# Patient Record
Sex: FEMALE | Race: BLACK OR AFRICAN AMERICAN | Employment: UNEMPLOYED | URBAN - METROPOLITAN AREA
[De-identification: names, ages, dates, MRNs, and addresses within clinical notes are randomized per-mention and may not be internally consistent; named-entity substitution may affect disease eponyms.]

---
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
## 2021-08-14 ENCOUNTER — HOSPITAL ENCOUNTER (EMERGENCY)
Age: 21
Discharge: HOME | End: 2021-08-14
Attending: EMERGENCY MEDICINE
Payer: COMMERCIAL

## 2021-08-14 VITALS
OXYGEN SATURATION: 100 % | DIASTOLIC BLOOD PRESSURE: 68 MMHG | TEMPERATURE: 97.4 F | HEART RATE: 88 BPM | HEIGHT: 64 IN | RESPIRATION RATE: 18 BRPM | WEIGHT: 293 LBS | BODY MASS INDEX: 50.02 KG/M2 | SYSTOLIC BLOOD PRESSURE: 139 MMHG

## 2021-08-14 DIAGNOSIS — B37.9 YEAST INFECTION: Primary | ICD-10-CM

## 2021-08-14 DIAGNOSIS — N76.0 VAGINITIS AND VULVOVAGINITIS: ICD-10-CM

## 2021-08-14 DIAGNOSIS — O16.3 ELEVATED BLOOD PRESSURE AFFECTING PREGNANCY IN THIRD TRIMESTER, ANTEPARTUM: ICD-10-CM

## 2021-08-14 LAB
BACTERIA WET PREP: ABNORMAL
BACTERIA: ABNORMAL /HPF
BILIRUBIN URINE: NEGATIVE
BLOOD, URINE: NEGATIVE
CLARITY: ABNORMAL
CLUE CELLS: ABNORMAL
COLOR: YELLOW
EPITHELIAL CELLS WET PREP: ABNORMAL
EPITHELIAL CELLS, UA: ABNORMAL /HPF (ref 0–5)
GLUCOSE URINE: NEGATIVE MG/DL
HCG(URINE) PREGNANCY TEST: POSITIVE
KETONES, URINE: NEGATIVE MG/DL
LEUKOCYTE ESTERASE, URINE: ABNORMAL
MICROSCOPIC EXAMINATION: YES
MUCUS: ABNORMAL /LPF
NITRITE, URINE: NEGATIVE
PH UA: 7 (ref 5–8)
PROTEIN UA: NEGATIVE MG/DL
RBC UA: ABNORMAL /HPF (ref 0–4)
RBC WET PREP: ABNORMAL
SOURCE WET PREP: ABNORMAL
SPECIFIC GRAVITY UA: 1.02 (ref 1–1.03)
TRICHOMONAS PREP: ABNORMAL
URINE TYPE: ABNORMAL
UROBILINOGEN, URINE: 0.2 E.U./DL
WBC UA: ABNORMAL /HPF (ref 0–5)
WBC WET PREP: ABNORMAL
YEAST WET PREP: ABNORMAL
YEAST: PRESENT /HPF

## 2021-08-14 PROCEDURE — 6370000000 HC RX 637 (ALT 250 FOR IP): Performed by: EMERGENCY MEDICINE

## 2021-08-14 PROCEDURE — 87210 SMEAR WET MOUNT SALINE/INK: CPT

## 2021-08-14 PROCEDURE — 87491 CHLMYD TRACH DNA AMP PROBE: CPT

## 2021-08-14 PROCEDURE — 99284 EMERGENCY DEPT VISIT MOD MDM: CPT

## 2021-08-14 PROCEDURE — 81001 URINALYSIS AUTO W/SCOPE: CPT

## 2021-08-14 PROCEDURE — 84703 CHORIONIC GONADOTROPIN ASSAY: CPT

## 2021-08-14 PROCEDURE — 87591 N.GONORRHOEAE DNA AMP PROB: CPT

## 2021-08-14 RX ORDER — CLOTRIMAZOLE 1 %
100 CREAM WITH APPLICATOR VAGINAL NIGHTLY
Qty: 7 SUPPOSITORY | Refills: 0 | Status: SHIPPED | OUTPATIENT
Start: 2021-08-14 | End: 2021-08-21

## 2021-08-14 RX ORDER — FLUCONAZOLE 150 MG/1
150 TABLET ORAL ONCE
Status: COMPLETED | OUTPATIENT
Start: 2021-08-14 | End: 2021-08-14

## 2021-08-14 RX ORDER — ASPIRIN 81 MG/1
81 TABLET ORAL DAILY
COMMUNITY

## 2021-08-14 RX ADMIN — FLUCONAZOLE 150 MG: 150 TABLET ORAL at 13:56

## 2021-08-14 ASSESSMENT — ENCOUNTER SYMPTOMS
SHORTNESS OF BREATH: 0
COLOR CHANGE: 0
VOMITING: 0
EYE PAIN: 0
NAUSEA: 0
BACK PAIN: 0
EYE REDNESS: 0
ABDOMINAL PAIN: 0
PHOTOPHOBIA: 0
CHEST TIGHTNESS: 0

## 2021-08-14 ASSESSMENT — PAIN DESCRIPTION - PAIN TYPE: TYPE: ACUTE PAIN

## 2021-08-14 ASSESSMENT — PAIN DESCRIPTION - LOCATION: LOCATION: VAGINA

## 2021-08-14 ASSESSMENT — PAIN SCALES - GENERAL: PAINLEVEL_OUTOF10: 4

## 2021-08-14 ASSESSMENT — PAIN DESCRIPTION - DESCRIPTORS: DESCRIPTORS: BURNING;ITCHING

## 2021-08-14 NOTE — ED NOTES
Transfer center on  The line with Dr. Regan Rodarte to speak with Dr. Kenrick Vasquez.      Chinedu Davila, [facility]  08/14/21 7184

## 2021-08-14 NOTE — ED PROVIDER NOTES
[address]        Pt Name: Melita Mast  MRN: [id number]  [place] 2000  Date of evaluation: 8/14/2021  Provider: Chrystie Boeck, MD  PCP: No primary care provider on file. CHIEF COMPLAINT       Chief Complaint   Patient presents with    Vaginal Itching    Dysuria       HISTORY OFPRESENT ILLNESS   (Location/Symptom, Timing/Onset, Context/Setting, Quality, Duration, Modifying Factors,Severity)  Note limiting factors. Melita Mast is a 22 y.o. female presenting today due to concern for having some increased vaginal discharge with some reported white clumps in her urine over the last few days. She states that last night she was having some burning and itching with urination along with some lower abdominal discomfort that caused her concern. Currently she denies any abdominal or pelvic pain. No chest pain or shortness of breath. No abnormal leg swelling. She states that she is seeing [facility] with obstetrics and is supposed to go this upcoming Wednesday for a appointment. She is 29 weeks pregnant. She denies any vaginal bleeding. She states baby was moving normally this morning. She denies any headache or vision changes. No fever. No nausea or vomiting. No concern with STDs. She is on aspirin for her blood pressure but denies any other medications other than what sounds like cetirizine for \"hayfever\" (starts with a C per patient and \"that sounds right\" when I said cetrizine). She denies feeling any contractions in regards to her pregnancy. REVIEW OF SYSTEMS    (2-9 systems for level 4, 10 or more for level 5)     Review of Systems   Constitutional: Negative for chills, diaphoresis, fatigue and fever. HENT: Negative for congestion. Eyes: Negative for photophobia, pain, redness and visual disturbance. Respiratory: Negative for chest tightness and shortness of breath.     Cardiovascular: Negative for chest pain and leg swelling. Gastrointestinal: Negative for abdominal pain, nausea and vomiting. Genitourinary: Positive for dysuria and vaginal discharge. Negative for difficulty urinating, flank pain, pelvic pain (none currently), vaginal bleeding and vaginal pain. Musculoskeletal: Negative for back pain (chronic intermittently but denying any currently and no changes recently with her chronic intermittent pain) and neck pain. Skin: Negative for color change and wound. Neurological: Negative for weakness, numbness and headaches. Psychiatric/Behavioral: Negative for confusion. The patient is not nervous/anxious. Positives and Pertinent negatives as per HPI. PASTMEDICAL HISTORY   History reviewed. No pertinent past medical history. SURGICAL HISTORY     History reviewed. No pertinent surgical history. CURRENT MEDICATIONS       Previous Medications    ASPIRIN 81 MG EC TABLET    Take 81 mg by mouth daily    MEDROXYPROGESTERONE ACETATE (DEPO-PROVERA IM)    Inject into the muscle    NAPROXEN (NAPROSYN) 500 MG TABLET    Take 1 tablet by mouth 2 times daily as needed for Pain (with food)    ONDANSETRON (ZOFRAN) 4 MG TABLET    Take 1 tablet by mouth every 8 hours as needed for Nausea       ALLERGIES     Food    FAMILY HISTORY     History reviewed. No pertinent family history.        SOCIAL HISTORY       Social History     Socioeconomic History    Marital status: Single     Spouse name: None    Number of children: None    Years of education: None    Highest education level: None   Occupational History    None   Tobacco Use    Smoking status: Passive Smoke Exposure - Never Smoker    Smokeless tobacco: Never Used   Substance and Sexual Activity    Alcohol use: No    Drug use: No    Sexual activity: Yes     Partners: Male   Other Topics Concern    None   Social History Narrative    None     Social Determinants of Health     Financial Resource Strain:     Difficulty of Paying Living Expenses: Food Insecurity:     Worried About Running Out of Food in the Last Year:     [address] in the Last Year:    Transportation Needs:     Lack of Transportation (Medical):  Lack of Transportation (Non-Medical):    Physical Activity:     Days of Exercise per Week:     Minutes of Exercise per Session:    Stress:     Feeling of Stress :    Social Connections:     Frequency of Communication with Friends and Family:     Frequency of Social Gatherings with Friends and Family:     Attends Sikh Services:     Active Member of Clubs or Organizations:     Attends Club or Organization Meetings:     Marital Status:    Intimate Partner Violence:     Fear of Current or Ex-Partner:     Emotionally Abused:     Physically Abused:     Sexually Abused:        SCREENINGS    Martin Coma Scale  Eye Opening: Spontaneous  Best Verbal Response: Oriented  Best Motor Response: Obeys commands  Martin Coma Scale Score: 15           PHYSICAL EXAM    (up to 7 for level 4, 8 or more for level 5)     ED Triage Vitals [08/14/21 1218]   BP Temp Temp Source Pulse Resp SpO2 Height Weight   (!) 154/96 97.4 °F (36.3 °C) Oral 110 18 100 % 5' 4\" (1.626 m) 296 lb 8 oz (134.5 kg)       Physical Exam  Vitals and nursing note reviewed. Constitutional:       General: She is awake. She is not in acute distress. Appearance: Normal appearance. She is well-developed and well-groomed. She is morbidly obese. She is not ill-appearing, toxic-appearing or diaphoretic. Interventions: She is not intubated. HENT:      Head: Normocephalic and atraumatic. Right Ear: External ear normal.      Left Ear: External ear normal.      Mouth/Throat:      Mouth: Mucous membranes are moist.   Eyes:      General:         Right eye: No discharge. Left eye: No discharge. Cardiovascular:      Rate and Rhythm: Normal rate and regular rhythm. Pulses: Normal pulses.    Pulmonary:      Effort: Pulmonary effort is normal. No tachypnea, bradypnea, accessory muscle usage, prolonged expiration, respiratory distress or retractions. She is not intubated. Breath sounds: Normal breath sounds and air entry. No stridor, decreased air movement or transmitted upper airway sounds. No decreased breath sounds, wheezing, rhonchi or rales. Abdominal:      General: Abdomen is flat. Bowel sounds are normal.      Palpations: Abdomen is soft. Tenderness: There is no abdominal tenderness. There is no right CVA tenderness, left CVA tenderness, guarding or rebound. Negative signs include Morton's sign and McBurney's sign. Comments: Gravid uterus    Genitourinary:     Comments: Patient declined pelvic exam   Musculoskeletal:         General: No swelling, tenderness, deformity or signs of injury. Normal range of motion. Cervical back: Normal range of motion and neck supple. No rigidity. Right lower leg: No edema. Left lower leg: No edema. Skin:     General: Skin is warm and dry. Neurological:      General: No focal deficit present. Mental Status: She is alert and oriented to person, place, and time. Mental status is at baseline. GCS: GCS eye subscore is 4. GCS verbal subscore is 5. GCS motor subscore is 6. Cranial Nerves: No dysarthria. Motor: Motor function is intact. No weakness, tremor, atrophy, abnormal muscle tone or seizure activity. Psychiatric:         Attention and Perception: Attention normal.         Mood and Affect: Mood and affect normal.         Speech: Speech normal. Speech is not slurred. Behavior: Behavior normal. Behavior is cooperative. Fetal heart tones equal 128.       DIAGNOSTIC RESULTS   :    Labs Reviewed   WET PREP, GENITAL - Abnormal; Notable for the following components:       Result Value    Yeast, Wet Prep 3+ (*)     All other components within normal limits    Narrative:     Performed at:  [facility]  [address] [place] [address]   Phone [phone] - Abnormal; Notable for the following components:    Clarity, UA SL CLOUDY (*)     Leukocyte Esterase, Urine MODERATE (*)     All other components within normal limits    Narrative:     Performed at:  [facility]  [facility] [address]  [facility] [place]   Phone [phone]   MICROSCOPIC URINALYSIS - Abnormal; Notable for the following components:    Mucus, UA Rare (*)     Epithelial Cells, UA 6-10 (*)     Bacteria, UA Rare (*)     Yeast, UA Present (*)     All other components within normal limits    Narrative:     Performed at:  [facility]  [facility] [place],  [place] [address]   Phone [phone]   C. TRACHOMATIS N.GONORRHOEAE DNA   PREGNANCY, URINE    Narrative:     Performed at:  [facility]  [facility] [place],  [place] [address]   Phone [phone]       All other labs were within normal range or not returned asof this dictation. EKG:  All EKG's are interpreted by the Emergency Department Physician who either signs or Co-signs this chart in the absence of a cardiologist.        RADIOLOGY:   Non-plain film images such as CT, Ultrasound and MRI are read by the radiologist. Anola Rodolfo images are visualized and preliminarily interpreted by the  ED Provider with the belowfindings:        Interpretation per the Radiologist below, if available at the time of this note:    No orders to display         PROCEDURES   Unless otherwise noted below, none     Procedures    CRITICAL CARE TIME   N/A    CONSULTS: Spoke with Dr. Marla Montgomery at [phone] and she recommended 1 dose of oral fluconazole at 150 mg but otherwise miconazole suppositories over the next week for her vaginitis due to yeast.  I did discuss with her that on Uptodate it recommend vaginal over oral medication with the azole drug class but she states since she is third trimester, this is safe to give during pregnancy and recommended a one-time dose. I did tell the patient about the obstetrics recommendation as well. IP CONSULT TO OB GYN    EMERGENCY DEPARTMENT COURSE and DIFFERENTIAL DIAGNOSIS/MDM:   Vitals:    Vitals:    08/14/21 1218 08/14/21 1325   BP: (!) 154/96 139/68   Pulse: 110 88   Resp: 18    Temp: 97.4 °F (36.3 °C)    TempSrc: Oral    SpO2: 100%    Weight: 296 lb 8 oz (134.5 kg)    Height: 5' 4\" (1.626 m)        Patient was given the following medications:  Medications   fluconazole (DIFLUCAN) tablet 150 mg (has no administration in time range)       Patient was evaluated due to concern for vaginal itching and discharge with the burning with urination starting last night but having some discharge throughout this pregnancy but now having some increased \"chunks\" that she noticed and thinking that she may have a yeast infection. She denies any concern with STDs. Abdominal exam was benign. At this point, we will get a urinalysis along with wet prep/GC evaluation. I did offer to do a pelvic exam but at this time she is declining. I did tell her that if she does self swab, and she states she has done this before, to just be very careful and gentle since she is pregnant. Her blood pressure was elevated and she states that her obstetrician is monitoring this. We will also evaluate for any signs of protein in the urine but at this point it does not sound like she is dealing with preeclampsia. I do plan to call her obstetrician once labs are back for further recommendations but ultimately do believe she should be able to be discharged. She was slightly tachycardic although denying any chest pain or shortness of breath and story not suggestive of pulmonary embolism or bacterial infection/sepsis. Repeat pulse was normal while in the emergency department and blood pressure improved. There is no protein in the urine at this time I have low suspicion for preeclampsia. She did have a yeast infection and therefore I did consult her obstetrician who recommended one-time dose of oral fluconazole 150 mg along with vaginal suppository of miconazole over the next week. Up-to-date did mention that if she were to go into labor, this can help to avoid any issues with the baby in regards to thrush. She knows to return to the emergency department if she develops any return of persistent abdominal pain, contractions, vaginal bleeding, fever, decreased baby movement, other concerning symptoms, but otherwise follow-up with her obstetrician in 5 days as scheduled. She was well-appearing and in no acute distress at time of discharge and felt comfortable with this plan. Since she has no abdominal pain at this time with a benign abdominal exam, I have very low suspicion for appendicitis, bowel obstruction, active labor, other significant concerns. The patient tolerated their visit well. The patient and / or the family were informed of the results of any tests, a time was given to answer questions. FINAL IMPRESSION      1. Yeast infection    2. Vaginitis and vulvovaginitis    3.  Elevated blood pressure affecting pregnancy in third trimester, antepartum          DISPOSITION/PLAN   DISPOSITION  -discharged in improved, stable condition      PATIENT REFERRED TO:  [address] [facility] [address] [facility] [address]  Go to   If symptoms worsen    Your obstetrician    Go on 8/18/2021  As scheduled      DISCHARGEMEDICATIONS:  New Prescriptions    MICONAZOLE (MICONAZOLE 7) 100 MG VAGINAL SUPPOSITORY    Place 1 suppository vaginally nightly for 7 days       DISCONTINUED MEDICATIONS:  Discontinued Medications    No medications on file              (Please note that portions of this note were completed with a voicerecognition program.  Efforts were made to edit the dictations but occasionally words are mis-transcribed.)    Maria Elena Molina MD (electronically signed)            Sharon Barrow MD  08/14/21 7608

## 2021-08-16 LAB
C TRACH DNA GENITAL QL NAA+PROBE: NEGATIVE
N. GONORRHOEAE DNA: NEGATIVE